# Patient Record
Sex: MALE | Race: WHITE | NOT HISPANIC OR LATINO | Employment: FULL TIME | URBAN - METROPOLITAN AREA
[De-identification: names, ages, dates, MRNs, and addresses within clinical notes are randomized per-mention and may not be internally consistent; named-entity substitution may affect disease eponyms.]

---

## 2017-09-11 ENCOUNTER — APPOINTMENT (OUTPATIENT)
Dept: RADIOLOGY | Facility: IMAGING CENTER | Age: 27
End: 2017-09-11
Attending: PHYSICIAN ASSISTANT

## 2017-09-11 ENCOUNTER — OCCUPATIONAL MEDICINE (OUTPATIENT)
Dept: URGENT CARE | Facility: PHYSICIAN GROUP | Age: 27
End: 2017-09-11

## 2017-09-11 ENCOUNTER — HOSPITAL ENCOUNTER (OUTPATIENT)
Facility: MEDICAL CENTER | Age: 27
End: 2017-09-11
Attending: PHYSICIAN ASSISTANT
Payer: COMMERCIAL

## 2017-09-11 VITALS
HEIGHT: 69 IN | RESPIRATION RATE: 12 BRPM | WEIGHT: 168 LBS | TEMPERATURE: 97.3 F | HEART RATE: 67 BPM | DIASTOLIC BLOOD PRESSURE: 70 MMHG | SYSTOLIC BLOOD PRESSURE: 108 MMHG | OXYGEN SATURATION: 95 % | BODY MASS INDEX: 24.88 KG/M2

## 2017-09-11 DIAGNOSIS — Z00.00 PHYSICAL EXAM, ANNUAL: ICD-10-CM

## 2017-09-11 LAB
ALBUMIN SERPL BCP-MCNC: 4.8 G/DL (ref 3.2–4.9)
ALBUMIN/GLOB SERPL: 1.7 G/DL
ALP SERPL-CCNC: 38 U/L (ref 30–99)
ALT SERPL-CCNC: 22 U/L (ref 2–50)
ANION GAP SERPL CALC-SCNC: 9 MMOL/L (ref 0–11.9)
APPEARANCE UR: CLEAR
AST SERPL-CCNC: 20 U/L (ref 12–45)
BASOPHILS # BLD AUTO: 0.4 % (ref 0–1.8)
BASOPHILS # BLD: 0.03 K/UL (ref 0–0.12)
BILIRUB SERPL-MCNC: 1 MG/DL (ref 0.1–1.5)
BILIRUB UR STRIP-MCNC: NORMAL MG/DL
BUN SERPL-MCNC: 14 MG/DL (ref 8–22)
CALCIUM SERPL-MCNC: 9.7 MG/DL (ref 8.5–10.5)
CHLORIDE SERPL-SCNC: 102 MMOL/L (ref 96–112)
CO2 SERPL-SCNC: 26 MMOL/L (ref 20–33)
COLOR UR AUTO: YELLOW
CREAT SERPL-MCNC: 0.97 MG/DL (ref 0.5–1.4)
EOSINOPHIL # BLD AUTO: 0.13 K/UL (ref 0–0.51)
EOSINOPHIL NFR BLD: 1.9 % (ref 0–6.9)
ERYTHROCYTE [DISTWIDTH] IN BLOOD BY AUTOMATED COUNT: 42.1 FL (ref 35.9–50)
GFR SERPL CREATININE-BSD FRML MDRD: >60 ML/MIN/1.73 M 2
GLOBULIN SER CALC-MCNC: 2.8 G/DL (ref 1.9–3.5)
GLUCOSE SERPL-MCNC: 69 MG/DL (ref 65–99)
GLUCOSE UR STRIP.AUTO-MCNC: NORMAL MG/DL
HCT VFR BLD AUTO: 46.8 % (ref 42–52)
HGB BLD-MCNC: 16.3 G/DL (ref 14–18)
IMM GRANULOCYTES # BLD AUTO: 0.01 K/UL (ref 0–0.11)
IMM GRANULOCYTES NFR BLD AUTO: 0.1 % (ref 0–0.9)
KETONES UR STRIP.AUTO-MCNC: NORMAL MG/DL
LEUKOCYTE ESTERASE UR QL STRIP.AUTO: NORMAL
LYMPHOCYTES # BLD AUTO: 2.64 K/UL (ref 1–4.8)
LYMPHOCYTES NFR BLD: 37.7 % (ref 22–41)
MCH RBC QN AUTO: 31.6 PG (ref 27–33)
MCHC RBC AUTO-ENTMCNC: 34.8 G/DL (ref 33.7–35.3)
MCV RBC AUTO: 90.7 FL (ref 81.4–97.8)
MONOCYTES # BLD AUTO: 0.74 K/UL (ref 0–0.85)
MONOCYTES NFR BLD AUTO: 10.6 % (ref 0–13.4)
NEUTROPHILS # BLD AUTO: 3.46 K/UL (ref 1.82–7.42)
NEUTROPHILS NFR BLD: 49.3 % (ref 44–72)
NITRITE UR QL STRIP.AUTO: NORMAL
NRBC # BLD AUTO: 0 K/UL
NRBC BLD AUTO-RTO: 0 /100 WBC
PH UR STRIP.AUTO: 5 [PH] (ref 5–8)
PLATELET # BLD AUTO: 209 K/UL (ref 164–446)
PMV BLD AUTO: 11 FL (ref 9–12.9)
POTASSIUM SERPL-SCNC: 4.3 MMOL/L (ref 3.6–5.5)
PROT SERPL-MCNC: 7.6 G/DL (ref 6–8.2)
PROT UR QL STRIP: NORMAL MG/DL
RBC # BLD AUTO: 5.16 M/UL (ref 4.7–6.1)
RBC UR QL AUTO: NORMAL
SODIUM SERPL-SCNC: 137 MMOL/L (ref 135–145)
SP GR UR STRIP.AUTO: 1
UROBILINOGEN UR STRIP-MCNC: NORMAL MG/DL
WBC # BLD AUTO: 7 K/UL (ref 4.8–10.8)

## 2017-09-11 PROCEDURE — 81002 URINALYSIS NONAUTO W/O SCOPE: CPT | Performed by: PHYSICIAN ASSISTANT

## 2017-09-11 PROCEDURE — 80053 COMPREHEN METABOLIC PANEL: CPT | Performed by: PHYSICIAN ASSISTANT

## 2017-09-11 PROCEDURE — 94010 BREATHING CAPACITY TEST: CPT | Performed by: PHYSICIAN ASSISTANT

## 2017-09-11 PROCEDURE — 8915 PR COMPREHENSIVE PHYSICAL: Performed by: PHYSICIAN ASSISTANT

## 2017-09-11 PROCEDURE — 71010 DX-CHEST-LIMITED (1 VIEW): CPT | Mod: TC | Performed by: PHYSICIAN ASSISTANT

## 2017-09-11 PROCEDURE — 85025 COMPLETE CBC W/AUTO DIFF WBC: CPT | Performed by: PHYSICIAN ASSISTANT

## 2017-09-11 NOTE — PROGRESS NOTES
27 y.o. male comes in for a preemployment physical. No major medical history, no chronic conditions, no chronic medications. No history of asthma, heart disease, murmurs, seizure disorder or syncopal episodes with activity.  Please see the chart for further information, however normal physical exam, cleared for employment without restrictions.      Spirometry by my evaluation shows no evidence of obstruction or restriction, normal spirometry.    Urinalysis in the office is completely normal.

## 2018-08-21 ENCOUNTER — NON-PROVIDER VISIT (OUTPATIENT)
Dept: URGENT CARE | Facility: PHYSICIAN GROUP | Age: 28
End: 2018-08-21

## 2018-08-21 DIAGNOSIS — Z02.89 ENCOUNTER FOR OCCUPATIONAL HEALTH ASSESSMENT: ICD-10-CM

## 2018-08-21 LAB
BREATH ALCOHOL COMMENT: NORMAL
POC BREATHALIZER: 0 PERCENT (ref 0–0.01)

## 2018-08-21 PROCEDURE — 82075 ASSAY OF BREATH ETHANOL: CPT | Performed by: PHYSICIAN ASSISTANT

## 2018-08-21 PROCEDURE — 7503 PR ESCREEN ACCT UDS COL ONLY: Performed by: PHYSICIAN ASSISTANT

## 2018-08-21 NOTE — PROGRESS NOTES
Logan Nicole is a 28 y.o. male here for a non-provider visit for Random PAE BAT and D/S     If abnormal was an in office provider notified today (if so, indicate provider)? No  Routed to PCP? No

## 2018-12-03 ENCOUNTER — APPOINTMENT (OUTPATIENT)
Dept: URGENT CARE | Facility: PHYSICIAN GROUP | Age: 28
End: 2018-12-03
Payer: COMMERCIAL

## 2018-12-03 ENCOUNTER — OCCUPATIONAL MEDICINE (OUTPATIENT)
Dept: URGENT CARE | Facility: PHYSICIAN GROUP | Age: 28
End: 2018-12-03
Payer: COMMERCIAL

## 2018-12-03 VITALS
HEIGHT: 69 IN | TEMPERATURE: 98.2 F | WEIGHT: 171 LBS | SYSTOLIC BLOOD PRESSURE: 100 MMHG | BODY MASS INDEX: 25.33 KG/M2 | DIASTOLIC BLOOD PRESSURE: 60 MMHG | HEART RATE: 72 BPM | OXYGEN SATURATION: 99 %

## 2018-12-03 DIAGNOSIS — Z77.098 EXPOSURE TO POTENTIALLY HAZARDOUS CHEMICAL: ICD-10-CM

## 2018-12-03 PROCEDURE — 99214 OFFICE O/P EST MOD 30 MIN: CPT | Mod: 29 | Performed by: PHYSICIAN ASSISTANT

## 2018-12-03 NOTE — LETTER
[address] [address]  Phone:  [phone] - Fax:  [phone]   [facility] Progress Report and Disability Certification  Date of Service: 12/3/2018   No Show:  No  Date / Time of Next Visit:     Claim Information   Patient Name: Logan Nicole  Claim Number:     Employer: [employer]  Date of Injury: 11/29/2018     Insurer / TPA: [employer]  ID / SSN:     Occupation:   Diagnosis: The encounter diagnosis was Exposure to potentially hazardous chemical.    Medical Information   Related to Industrial Injury?      Subjective Complaints:  No subjective complaints, potential exposure to jet fuel   Objective Findings: Normal physical exam   Pre-Existing Condition(s):     Assessment:   Initial Visit    Status: Discharged /  MMI  Permanent Disability:     Plan:      Diagnostics:      Comments:       Disability Information   Status: Released to Full Duty    From:     Through:   Restrictions are:     Physical Restrictions   Sitting:    Standing:    Stooping:    Bending:      Squatting:    Walking:    Climbing:    Pushing:      Pulling:    Other:    Reaching Above Shoulder (L):   Reaching Above Shoulder (R):       Reaching Below Shoulder (L):    Reaching Below Shoulder (R):      Not to exceed Weight Limits   Carrying(hrs):   Weight Limit(lb):   Lifting(hrs):   Weight  Limit(lb):     Comments:      Repetitive Actions   Hands: i.e. Fine Manipulations from Grasping:     Feet: i.e. Operating Foot Controls:     Driving / Operate Machinery:     Physician Name: [facility] Physician Signature: DEVON Mar P.A.-C. e-Signature: Dr. Bereket Dickey, Medical Director   Clinic Name / Location: [address] [address] Clinic Phone Number: Dept: [phone]   Appointment Time: 10:15 Am Visit Start Time: 11:06 AM   Check-In Time:  10:22 Am Visit Discharge Time:  11:28 AM   Original-Treating Physician or  Chiropractor    Page 2-Insurer/TPA    Page 3-Employer    Page 4-Employee

## 2018-12-03 NOTE — PROGRESS NOTES
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 male who presents today because He is here for work comp claim.    Date of injury 11/29/2018.  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  He is completely asymptomatic, no headaches, vision changes, coughing, nausea, vomiting or diarrhea.  States that he was told by his employer that he needed to be evaluated.    There are no active problems to display for this patient.      Allergies:Patient has no allergy information on record.    No current [employer]-ordered outpatient prescriptions on file.     No current [employer]-ordered facility-administered medications on file.        History reviewed. No pertinent past medical history.    Social History   Substance Use Topics   • Smoking status: Former Smoker     Packs/day: 0.25     Years: 1.00     Quit date: 12/3/2008   • Smokeless tobacco: Never Used   • Alcohol use Not on file       No family status information on file.   History reviewed. No pertinent family history.    ROS:  Review of Systems   Constitutional: Negative for fever, chills, weight loss and malaise/fatigue.   HENT: Negative for ear pain, nosebleeds, congestion, sore throat and neck pain.    Eyes: Negative for blurred vision.   Respiratory: Negative for cough, sputum production, shortness of breath and wheezing.    Cardiovascular: Negative for chest pain, palpitations, orthopnea and leg swelling.   Gastrointestinal: Negative for heartburn, nausea, vomiting and abdominal pain.   Genitourinary: Negative for dysuria, urgency and frequency.     Exam:  Blood pressure 100/60, pulse 72, temperature 36.8 °C (98.2 °F), temperature source Temporal, height 1.753 m (5' 9\"), " weight 77.6 kg (171 lb), SpO2 99 %.  General:  Well nourished, well developed male in NAD  Head:Normocephalic, atraumatic  Eyes: PERRLA, EOM within normal limits, no conjunctival injection, no scleral icterus, visual fields and acuity grossly intact.  Ears: Normal shape and symmetry, no tenderness, no discharge. External canals are without any significant edema or erythema. Tympanic membranes are without any inflammation, no effusion. Gross auditory acuity is intact  Nose: Symmetrical without tenderness, no discharge.  Mouth: reasonable hygiene, no erythema exudates or tonsillar enlargement.  Neck: no masses, range of motion within normal limits, no tracheal deviation. No obvious thyroid enlargement.  Pulmonary: chest is symmetrical with respiration, no wheezes, crackles, or rhonchi.  Cardiovascular: regular rate and rhythm without murmurs, rubs, or gallops.  Extremities: no clubbing, cyanosis, or edema.    Please note that this dictation was created using voice recognition software. I have made every reasonable attempt to correct obvious errors, but I expect that there are errors of grammar and possibly content that I did not discover before finalizing the note.    Assessment/Plan:  1. Exposure to potentially hazardous chemical     Asymptomatic, normal vital signs.  No follow-up necessary

## 2018-12-03 NOTE — LETTER
"EMPLOYEE’S CLAIM FOR COMPENSATION/ REPORT OF INITIAL TREATMENT  FORM C-4    EMPLOYEE’S CLAIM - PROVIDE ALL INFORMATION REQUESTED   First Name  Logan Last Name  Bonnie Birthdate                    1990                Sex  male Claim Number   Home Address  [address] Age  28 y.o. Height  1.753 m (5' 9\") Weight  77.6 kg (171 lb) [facility]     [place]  [address] Telephone  [phone] (home)    Mailing Address  [address] [place]  [address] Primary Language Spoken  English    Insurer   Third Party   [employer]   Employee's Occupation (Job Title) When Injury or Occupational Disease Occurred      Employer's Name  [employer]  Telephone  [phone]    Employer Address  [address]  [place]  [address]    Date of Injury  11/29/2018               Hour of Injury  11:00 AM Date Employer Notified  11/29/2018 Last Day of Work after Injury or Occupational Disease  12/3/2018 Supervisor to Whom Injury Reported  Luis Laddonia   Address or Location of Accident (if applicable)  [[address]   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 (Be specific an answer in detail. 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   If you believe that you have an occupational disease, when did you first have knowledge of the disability and it relationship to your employment?  NA Witnesses to the Accident  Chris Rudd      Nature of Injury or Occupational Disease  Defer  Part(s) of Body Injured or Affected  Defer, ,     I certify that the above is true and correct to the best of my knowledge and that I have provided this information in order to obtain the benefits of [place]  I hereby authorize any physician, chiropractor, surgeon, practitioner, or other person, any hospital, including [facility] or [facility], any medical service organization, any insurance company, or other institution or organization to release to each other, any medical or other information, including benefits paid or payable, pertinent to this injury or disease, except information relative to diagnosis, treatment and/or counseling for AIDS, psychological conditions, alcohol or controlled substances, for which I must give specific authorization.  A Photostat of this authorization shall be as valid as the original.     Date 12/03/2018   [facility]   Employee’s Signature   THIS REPORT MUST BE COMPLETED AND MAILED WITHIN 3 WORKING DAYS OF TREATMENT   Place  [facility]  Name of [facility]  She   Date  12/3/2018 Diagnosis  (Z77.098) Exposure to potentially hazardous chemical Is there evidence the injured employee was under the influence of alcohol and/or another controlled substance at the time of accident?   Hour  11:06 AM Description of Injury or Disease  The encounter diagnosis was Exposure to potentially hazardous chemical. No   Treatment  No treatment necessary  Have you advised the patient to remain off work five days or more? No   X-Ray Findings      If Yes   From Date  To Date      From information given by the employee, together with medical evidence, can you directly connect this injury or occupational disease as job incurred?  Yes If No Full Duty  Yes Modified Duty      Is additional medical care by a physician indicated?  No If Modified Duty, Specify any Limitations / Restrictions      Do you know of any previous injury or disease contributing to this condition or occupational disease?                            No   Date  12/3/2018 Print Doctor’s Name [facility] I certify the employer’s  "copy of  this form was mailed on:   Address  [address] Insurer’s Use Only     [facility] Zip  [address]    Provider’s Tax ID Number  [id number] Telephone  Dept: [phone]        deanne-DEVON Conde P.A.-C.   e-Signature: Dr. Bereket Dickey, Medical Director Degree           ORIGINAL-TREATING PHYSICIAN OR CHIROPRACTOR    PAGE 2-INSURER/TPA    PAGE 3-EMPLOYER    PAGE 4-EMPLOYEE             Form C-4 (rev10/07)              BRIEF DESCRIPTION OF RIGHTS AND BENEFITS  (Pursuant to [place] 616[place])    Notice of Injury or Occupational Disease (Incident Report Form C-1): If an injury or occupational disease (OD) arises out of and in the  course of employment, you must provide written notice to your employer as soon as practicable, but no later than 7 days after the accident or  OD. Your employer shall maintain a sufficient supply of the required forms.    Claim for Compensation (Form C-4): If medical treatment is sought, the form C-4 is available at the place of initial treatment. A completed  \"Claim for Compensation\" (Form C-4) must be filed within 90 days after an accident or OD. The treating physician or chiropractor must,  within 3 working days after treatment, complete and mail to the employer, the employer's insurer and third-party , the Claim for  Compensation.    Medical Treatment: If you require medical treatment for your on-the-job injury or OD, you may be required to select a physician or  chiropractor from a list provided by your workers’ compensation insurer, if it has contracted with an Organization for Managed Care (MCO) or  Preferred Provider Organization (PPO) or providers of health care. If your employer has not entered into a contract with an MCO or PPO, you  may select a physician or chiropractor from the Panel of Physicians and Chiropractors. Any medical costs related to your industrial injury or  OD will be paid by your insurer.    Temporary Total Disability (TTD): If " your doctor has certified that you are unable to work for a period of at least 5 consecutive days, or 5  cumulative days in a 20-day period, or places restrictions on you that your employer does not accommodate, you may be entitled to TTD  compensation.    Temporary Partial Disability (TPD): If the wage you receive upon reemployment is less than the compensation for TTD to which you are  entitled, the insurer may be required to pay you TPD compensation to make up the difference. TPD can only be paid for a maximum of 24  months.    Permanent Partial Disability (PPD): When your medical condition is stable and there is an indication of a PPD as a result of your injury or  OD, within 30 days, your insurer must arrange for an evaluation by a rating physician or chiropractor to determine the degree of your PPD. The  amount of your PPD award depends on the date of injury, the results of the PPD evaluation and your age and wage.    Permanent Total Disability (PTD): If you are medically certified by a treating physician or chiropractor as permanently and totally disabled  and have been granted a PTD status by your insurer, you are entitled to receive monthly benefits not to exceed 66 2/3% of your average  monthly wage. The amount of your PTD payments is subject to reduction if you previously received a PPD award.    Vocational Rehabilitation Services: You may be eligible for vocational rehabilitation services if you are unable to return to the job due to a  permanent physical impairment or permanent restrictions as a result of your injury or occupational disease.    Transportation and Per Earlene Reimbursement: You may be eligible for travel expenses and per earlene associated with medical treatment.    Reopening: You may be able to reopen your claim if your condition worsens after claim closure.    Appeal Process: If you disagree with a written determination issued by the insurer or the insurer does not respond to your request,  you may  appeal to the Department of Administration, , by following the instructions contained in your determination letter. You must  appeal the determination within 70 days from the date of the determination letter at [address], [address], [place], [place]  [address], or [address], [address], [place], [place] [address]. If you disagree with the  decision, you may appeal to the  Department of Administration, . You must file your appeal within 30 days from the date of the  decision  letter at [address], [address], [place], [place] [address], or [address], [address], [place], [place] [address]. If you  disagree with a decision of an , you may file a petition for judicial review with the District Court. You must do so within 30  days of the Appeal Officer’s decision. You may be represented by an  at your own expense or you may contact the [facility] for possible  representation.    [place]  for Injured Workers (NAIW): If you disagree with a  decision, you may request that NAIW represent you  without charge at an  Hearing. For information regarding denial of benefits, you may contact the [facility] at: [address], [address], [place] [address], [phone], or [address], [address], [place] [address], [phone]    To File a Complaint with the Division: If you wish to file a complaint with the  of the Division of Industrial Relations (DIR),  please contact the Workers’ Compensation Section, [address], [address], [place], [place] [address], telephone [phone], or  [address] [facility] [address], [place] [address], telephone [phone].    For assistance with Workers’ Compensation Issues: you may contact the Office of the Governor Consumer Health Assistance, [address], [address], [place], [place] [address], Toll Free 1-926.396.9785, Web site: http://govcha.[facility] [facility].nv., E-mail  [email or web address].[employer] [facility].[email or web address].                                                                                                                                                                                                                                   __________________________________________________________________                                                                   ___12/03/2018______                Employee Name / Signature                                                                                                                                                       Date                                                                                                                                                                                                     D-2 (rev. 10/07)

## 2019-07-25 ENCOUNTER — OFFICE VISIT (OUTPATIENT)
Dept: URGENT CARE | Facility: PHYSICIAN GROUP | Age: 29
End: 2019-07-25
Payer: COMMERCIAL

## 2019-07-25 VITALS
DIASTOLIC BLOOD PRESSURE: 86 MMHG | WEIGHT: 171 LBS | TEMPERATURE: 97.8 F | HEART RATE: 80 BPM | BODY MASS INDEX: 25.33 KG/M2 | RESPIRATION RATE: 16 BRPM | OXYGEN SATURATION: 98 % | HEIGHT: 69 IN | SYSTOLIC BLOOD PRESSURE: 128 MMHG

## 2019-07-25 DIAGNOSIS — J06.9 UPPER RESPIRATORY TRACT INFECTION, UNSPECIFIED TYPE: ICD-10-CM

## 2019-07-25 PROCEDURE — 94640 AIRWAY INHALATION TREATMENT: CPT | Performed by: PHYSICIAN ASSISTANT

## 2019-07-25 PROCEDURE — 99214 OFFICE O/P EST MOD 30 MIN: CPT | Mod: 25 | Performed by: PHYSICIAN ASSISTANT

## 2019-07-25 RX ORDER — ALBUTEROL SULFATE 2.5 MG/3ML
2.5 SOLUTION RESPIRATORY (INHALATION) ONCE
Status: COMPLETED | OUTPATIENT
Start: 2019-07-25 | End: 2019-07-25

## 2019-07-25 RX ORDER — ALBUTEROL SULFATE 90 UG/1
2 AEROSOL, METERED RESPIRATORY (INHALATION) EVERY 6 HOURS PRN
Qty: 8.5 G | Refills: 0 | Status: SHIPPED | OUTPATIENT
Start: 2019-07-25

## 2019-07-25 RX ORDER — AZITHROMYCIN 250 MG/1
TABLET, FILM COATED ORAL
Qty: 6 TAB | Refills: 0 | Status: SHIPPED | OUTPATIENT
Start: 2019-07-25

## 2019-07-25 RX ADMIN — ALBUTEROL SULFATE 2.5 MG: 2.5 SOLUTION RESPIRATORY (INHALATION) at 16:36

## 2019-07-25 ASSESSMENT — ENCOUNTER SYMPTOMS
SHORTNESS OF BREATH: 1
SORE THROAT: 1
MYALGIAS: 0
EYE DISCHARGE: 0
HEADACHES: 0
ABDOMINAL PAIN: 0
VOMITING: 0
NAUSEA: 0
WHEEZING: 0
SINUS PAIN: 1
EYE REDNESS: 0
COUGH: 1
FEVER: 0

## 2019-07-25 NOTE — PROGRESS NOTES
"Subjective:      Logan Nicole is a 29 y.o. male who presents with Cough        Cough   This is a new problem. Episode onset: x 2 weeks. The problem has been gradually worsening. The problem occurs constantly. The cough is productive of sputum. Associated symptoms include ear congestion, ear pain, nasal congestion, a sore throat (now resolved) and shortness of breath (intermittent). Pertinent negatives include no chest pain, eye redness, fever, headaches, myalgias, rash or wheezing. Nothing aggravates the symptoms. He has tried OTC cough suppressant for the symptoms. The treatment provided mild relief.     PMH:  has no past medical history on file.  MEDS: No current outpatient prescriptions on file.  ALLERGIES: No Known Allergies  SURGHX: No past surgical history on file.  SOCHX:  reports that he quit smoking about 10 years ago. He has a 0.25 pack-year smoking history. He has never used smokeless tobacco.  FH: Family history was reviewed, no pertinent findings to report      Review of Systems   Constitutional: Negative for fever.   HENT: Positive for congestion, ear pain, sinus pain and sore throat (now resolved).    Eyes: Negative for discharge and redness.   Respiratory: Positive for cough and shortness of breath (intermittent). Negative for wheezing.    Cardiovascular: Negative for chest pain and leg swelling.   Gastrointestinal: Negative for abdominal pain, nausea and vomiting.   Musculoskeletal: Negative for joint pain and myalgias.   Skin: Negative for rash.   Neurological: Negative for headaches.          Objective:     /86 (BP Location: Right arm, Patient Position: Sitting, BP Cuff Size: Adult)   Pulse 80   Temp 36.6 °C (97.8 °F) (Temporal)   Resp 16   Ht 1.753 m (5' 9\")   Wt 77.6 kg (171 lb)   SpO2 98%   BMI 25.25 kg/m²      Physical Exam   Constitutional: He is oriented to person, place, and time. He appears well-developed and well-nourished. No distress.   HENT:   Head: Normocephalic and " atraumatic.   Right Ear: External ear normal.   Left Ear: External ear normal.   Nose: Nose normal.   Mouth/Throat: Oropharynx is clear and moist.   Eyes: Conjunctivae and EOM are normal.   Neck: Normal range of motion. Neck supple.   Cardiovascular: Normal rate, regular rhythm and normal heart sounds.    Pulmonary/Chest: Effort normal. He has wheezes (mild).   Neurological: He is alert and oriented to person, place, and time.   Skin: Skin is warm and dry.          Progress:   Albuterol Nebulizer given in clinic. The patient reports improvement after the breathing treatment. The patient's lung sounds were also improved on re-ascultation.      Assessment/Plan:     1. Upper respiratory tract infection, unspecified type  The patient's presenting symptoms and physical exam are consistent with an acute upper respiratory infection. On physical exam, the patient had mild wheezes. His POX was within normal limits. The patient's wheezing improved after an albuterol nebulizer today in clinic. Given the duration of the patient's symptoms, will prescribe the patient a course of antibiotics. Will also prescribe the patient an albuterol inhaler for his intermittent shortness of breath. Recommend OTC medications and supportive care for symptomatic management. Discussed strict return precautions with the patient, and he verbalized understanding.     - albuterol (PROVENTIL) 2.5mg/3ml nebulizer solution 2.5 mg; 3 mL by Nebulization route Once.  - azithromycin (ZITHROMAX) 250 MG Tab; Take 500mg PO once, then take 250 mg PO daily x 4 days.  Dispense: 6 Tab; Refill: 0  - albuterol 108 (90 Base) MCG/ACT Aero Soln inhalation aerosol; Inhale 2 Puffs by mouth every 6 hours as needed for Shortness of Breath.  Dispense: 8.5 g; Refill: 0    OTC Tylenol or Motrin for fever/discomfort.  OTC cough/cold medication for symptomatic relief  OTC Supportive Care for Congestion - saline nasal spray or neti pot  Drink plenty of fluids  Follow-up with PCP    Return to clinic or go to the ED if symptoms worsen or fail to improve, or if patient develops severe fever, worsening/increasing shortness of breath, worsening cough with sputum production, and/or chest pain.     Discussed plan with the patient, and he agrees to the above.

## 2020-02-24 ENCOUNTER — OFFICE VISIT (OUTPATIENT)
Dept: URGENT CARE | Facility: PHYSICIAN GROUP | Age: 30
End: 2020-02-24
Payer: COMMERCIAL

## 2020-02-24 VITALS
WEIGHT: 178 LBS | BODY MASS INDEX: 26.36 KG/M2 | OXYGEN SATURATION: 98 % | RESPIRATION RATE: 12 BRPM | DIASTOLIC BLOOD PRESSURE: 62 MMHG | SYSTOLIC BLOOD PRESSURE: 118 MMHG | HEART RATE: 76 BPM | HEIGHT: 69 IN | TEMPERATURE: 99.4 F

## 2020-02-24 DIAGNOSIS — B96.89 ACUTE BACTERIAL SINUSITIS: ICD-10-CM

## 2020-02-24 DIAGNOSIS — J01.90 ACUTE BACTERIAL SINUSITIS: ICD-10-CM

## 2020-02-24 PROCEDURE — 99214 OFFICE O/P EST MOD 30 MIN: CPT | Performed by: PHYSICIAN ASSISTANT

## 2020-02-24 RX ORDER — NEOMYCIN SULFATE, POLYMYXIN B SULFATE, HYDROCORTISONE 3.5; 10000; 1 MG/ML; [USP'U]/ML; MG/ML
SOLUTION/ DROPS AURICULAR (OTIC)
COMMUNITY
Start: 2020-01-21

## 2020-02-24 RX ORDER — AMOXICILLIN AND CLAVULANATE POTASSIUM 875; 125 MG/1; MG/1
1 TABLET, FILM COATED ORAL 2 TIMES DAILY
Qty: 14 TAB | Refills: 0 | Status: SHIPPED | OUTPATIENT
Start: 2020-02-24 | End: 2020-03-02

## 2020-02-24 NOTE — PROGRESS NOTES
Chief Complaint   Patient presents with   • Congestion       HISTORY OF PRESENT ILLNESS: Patient is a 29 y.o. male who presents today because he has a 3-week history of a biphasic illness that started with what he thought was a cold.  He took some Sudafed and started to feel better for a few days.  However over the last 5 to 7 days he has had significant worsening of the nasal and sinus pain, pressure, drainage and congestion and developed a sore throat as well.  He continues to take Sudafed without improvement    There are no active problems to display for this patient.      Allergies:Patient has no known allergies.    Current Outpatient Medications Ordered in Epic   Medication Sig Dispense Refill   • amoxicillin-clavulanate (AUGMENTIN) 875-125 MG Tab Take 1 Tab by mouth 2 times a day for 7 days. 14 Tab 0   • NEOMYCIN-POLYMYXIN-HC, OTIC, 1 % Solution      • azithromycin (ZITHROMAX) 250 MG Tab Take 500mg PO once, then take 250 mg PO daily x 4 days. 6 Tab 0   • albuterol 108 (90 Base) MCG/ACT Aero Soln inhalation aerosol Inhale 2 Puffs by mouth every 6 hours as needed for Shortness of Breath. 8.5 g 0     No current Epic-ordered facility-administered medications on file.        History reviewed. No pertinent past medical history.    Social History     Tobacco Use   • Smoking status: Former Smoker     Packs/day: 0.25     Years: 1.00     Pack years: 0.25     Last attempt to quit: 12/3/2008     Years since quittin.2   • Smokeless tobacco: Never Used   Substance Use Topics   • Alcohol use: Not on file   • Drug use: Not on file       No family status information on file.   History reviewed. No pertinent family history.    ROS:  Review of Systems   Constitutional: Negative for fever, chills, weight loss and malaise/fatigue.   HENT: Negative for ear pain, nosebleeds, positive for nasal and sinus congestion, sore throat and no neck pain.    Eyes: Negative for blurred vision.   Respiratory: Positive for cough, sputum  "production, no shortness of breath and wheezing.    Cardiovascular: Negative for chest pain, palpitations, orthopnea and leg swelling.   Gastrointestinal: Negative for heartburn, nausea, vomiting and abdominal pain.   Genitourinary: Negative for dysuria, urgency and frequency.     Exam:  /62   Pulse 76   Temp 37.4 °C (99.4 °F) (Temporal)   Resp 12   Ht 1.753 m (5' 9\")   Wt 80.7 kg (178 lb)   SpO2 98%   General:  Well nourished, well developed male in NAD  Head:Normocephalic, atraumatic  Eyes: PERRLA, EOM within normal limits, no conjunctival injection, no scleral icterus, visual fields and acuity grossly intact.  Ears: Normal shape and symmetry, no tenderness, no discharge. External canals are without any significant edema or erythema. Tympanic membranes are without any inflammation, no effusion. Gross auditory acuity is intact  Nose: Symmetrical without tenderness, no discharge.  Nasal mucosa on the right is erythematous and edematous almost to the point of occlusion with posterior nasal cavity exudate  Mouth: reasonable hygiene, no erythema exudates or tonsillar enlargement.  Neck: no masses, range of motion within normal limits, no tracheal deviation. No obvious thyroid enlargement.  Pulmonary: chest is symmetrical with respiration, no wheezes, crackles, or rhonchi.  Cardiovascular: regular rate and rhythm without murmurs, rubs, or gallops.  Extremities: no clubbing, cyanosis, or edema.    Please note that this dictation was created using voice recognition software. I have made every reasonable attempt to correct obvious errors, but I expect that there are errors of grammar and possibly content that I did not discover before finalizing the note.    Assessment/Plan:  1. Acute bacterial sinusitis  amoxicillin-clavulanate (AUGMENTIN) 875-125 MG Tab   Continue Sudafed as tolerated    Followup with primary care in the next 7-10 days if not significantly improving, return to the urgent care or go to the " emergency room sooner for any worsening of symptoms.